# Patient Record
Sex: FEMALE | Race: WHITE | NOT HISPANIC OR LATINO | URBAN - METROPOLITAN AREA
[De-identification: names, ages, dates, MRNs, and addresses within clinical notes are randomized per-mention and may not be internally consistent; named-entity substitution may affect disease eponyms.]

---

## 2021-04-05 ENCOUNTER — OFFICE (OUTPATIENT)
Dept: URBAN - METROPOLITAN AREA CLINIC 107 | Facility: CLINIC | Age: 31
End: 2021-04-05

## 2021-04-05 VITALS
WEIGHT: 127 LBS | TEMPERATURE: 98.1 F | HEIGHT: 61 IN | SYSTOLIC BLOOD PRESSURE: 133 MMHG | DIASTOLIC BLOOD PRESSURE: 89 MMHG | HEART RATE: 80 BPM

## 2021-04-05 DIAGNOSIS — K52.839 MICROSCOPIC COLITIS, UNSPECIFIED: ICD-10-CM

## 2021-04-05 PROCEDURE — 99204 OFFICE O/P NEW MOD 45 MIN: CPT | Performed by: INTERNAL MEDICINE

## 2021-04-05 RX ORDER — BUDESONIDE 3 MG/1
CAPSULE ORAL
Qty: 132 | Refills: 0 | Status: COMPLETED
Start: 2021-04-05 | End: 2024-05-23

## 2021-04-05 NOTE — SERVICEHPINOTES
Jessie Gonsalez   is seen for an initial visit today.     She was diagnosed with IBS in [facility].  SHe has had issues for years. BRIn October of 2020, things got much worse and she was in and out of the bathroom up to 15 times a day. BRNo new triggers as far as she can remember, no NSAID used.  She had the colonoscopy in January, she was told it was lymphocytic colitis.  She had an EGD, US and colonoscope.  She thinks she was tested for celiac. Her pcp starte dicyclomine 20 mg 4 times, GI switched her to dicyclomine 20 mg three times a day.  SHe is taking mesalamine.  she is taking 400 mg 6 pills a day. She has  been on that since january.  She was also put on prednisone recently 30 mg a day.  They were planning on a taper.  Overall she is better but still having a lot of symptoms and not feeling like she is back to baseline.    Her mother was recently diagnosed with celiac and also has sjogrens. BR My nurse has reviewed and updated the medication list with the patient. I have reviewed the medication list along with the documented medical, social and family history. Pertinent details are also noted above in the HPI.

## 2021-04-05 NOTE — SERVICENOTES
Our goal is to partner with you to improve your health and well being. It is important for you to complete necessary testing and follow the instructions given to you at your clinic visit. Our office will call you within 2 weeks with results of any testing but you may also call sooner to obtain results - [phone].   If you have any questions or concerns please feel free to call us.  We take your care very seriously and we thank you for your trust!
- entocort/budesonide take 3 pills a day (9 mg) for 1 month, then 2 pills a day (6 mg) for 2 weeks then 1 pill a day (3 mg) for 2 weeks then stop.
- decrease prednisone to 20 mg (2 pills) for 2 days, then 10 mg for 2 days then stop 
- stop mesalamine 
- You can take pepto bismal 1-2 pills up to 4 times a day as needed to help control diarrhea
- you can take immodium 1 pill up to 4 times a day to help control diarrhea.  You can try taking one about 30 min before your meals and at bed
- I will get records and determine if we need to do additional testing
- follow up in 1 months

## 2021-05-06 ENCOUNTER — OFFICE (OUTPATIENT)
Dept: URBAN - METROPOLITAN AREA CLINIC 72 | Facility: CLINIC | Age: 31
End: 2021-05-06

## 2021-05-06 VITALS
SYSTOLIC BLOOD PRESSURE: 124 MMHG | HEART RATE: 76 BPM | WEIGHT: 128 LBS | TEMPERATURE: 97.3 F | DIASTOLIC BLOOD PRESSURE: 85 MMHG | HEIGHT: 61 IN

## 2021-05-06 DIAGNOSIS — D80.2 SELECTIVE DEFICIENCY OF IMMUNOGLOBULIN A [IGA]: ICD-10-CM

## 2021-05-06 DIAGNOSIS — K58.0 IRRITABLE BOWEL SYNDROME WITH DIARRHEA: ICD-10-CM

## 2021-05-06 DIAGNOSIS — K52.839 MICROSCOPIC COLITIS, UNSPECIFIED: ICD-10-CM

## 2021-05-06 PROCEDURE — 99214 OFFICE O/P EST MOD 30 MIN: CPT | Performed by: NURSE PRACTITIONER

## 2021-06-10 ENCOUNTER — TELEHEALTH PROVIDED OTHER THAN IN PATIENT'S HOME (OUTPATIENT)
Dept: URBAN - METROPOLITAN AREA CLINIC 72 | Facility: CLINIC | Age: 31
End: 2021-06-10

## 2021-06-10 VITALS — WEIGHT: 120 LBS | HEIGHT: 61 IN

## 2021-06-10 DIAGNOSIS — K52.839 MICROSCOPIC COLITIS, UNSPECIFIED: ICD-10-CM

## 2021-06-10 DIAGNOSIS — D80.2 SELECTIVE DEFICIENCY OF IMMUNOGLOBULIN A [IGA]: ICD-10-CM

## 2021-06-10 DIAGNOSIS — K58.0 IRRITABLE BOWEL SYNDROME WITH DIARRHEA: ICD-10-CM

## 2021-06-10 PROCEDURE — 99214 OFFICE O/P EST MOD 30 MIN: CPT | Performed by: NURSE PRACTITIONER

## 2024-05-23 ENCOUNTER — OFFICE (OUTPATIENT)
Dept: URBAN - METROPOLITAN AREA CLINIC 72 | Facility: CLINIC | Age: 34
End: 2024-05-23

## 2024-05-23 VITALS — WEIGHT: 115 LBS | HEIGHT: 61 IN

## 2024-05-23 DIAGNOSIS — R19.7 DIARRHEA, UNSPECIFIED: ICD-10-CM

## 2024-05-23 DIAGNOSIS — Z87.19 PERSONAL HISTORY OF OTHER DISEASES OF THE DIGESTIVE SYSTEM: ICD-10-CM

## 2024-05-23 DIAGNOSIS — E74.31 SUCRASE-ISOMALTASE DEFICIENCY: ICD-10-CM

## 2024-05-23 NOTE — SERVICEHPINOTES
She was initially seen by Dr Avila 4/5/2021brShe was diagnosed with IBS in [facility]. SHe has had issues for years.brIn October of 2020, things got much worse and she was in and out of the bathroom up to 15 times a day.brNo new triggers as far as she can remember, no NSAID used. She had the colonoscopy in January, she was told it was lymphocytic colitis. She had an EGD, US and colonoscope. She thinks she was tested for celiac. Her pcp starte dicyclomine 20 mg 4 times, GI switched her to dicyclomine 20 mg three times a day. SHe is taking mesalamine. she is taking 400 mg 6 pills a day. She has been on that since january. She was also put on prednisone recently 30 mg a day. They were planning on a taper. Overall she is better but still having a lot of symptoms and not feeling like she is back to baseline.Her mother was recently diagnosed with celiac and also has sjogrens.[facility] nurse has reviewed and updated the medication list with the patient. I have reviewed the medication list along with the documented medical, social and family history. Pertinent details are also noted above in the HPI.brAssessmentbr- Microscopic colitisbrPlan from first visitbr- entocort/budesonide take 3 pills a day (9 mg) for 1 month, then 2 pills a day (6 mg) for 2 weeks then 1 pill a day (3 mg) for 2 weeks then stop.br- decrease prednisone to 20 mg (2 pills) for 2 days, then 10 mg for 2 days then stopbr- stop mesalaminebr- You can take pepto bismal 1-2 pills up to 4 times a day as needed to help control diarrheabr- you can take immodium 1 pill up to 4 times a day to help control diarrhea. You can try taking one about 30 min before your meals and at bedbr- I will get records and determine if we need to do additional testingbr- follow up in 1 monthShe was seen  5/6/2021brAt her last visit she was having 5-6 bowel movements daily. She is now having 3-4 bowel movements daily. The bowel movements are formed and soft not watery. She has no further abdominal cramping. She has some bloating. Prior to January she would have 2-3 bowel movements first thing in the morning. She is completed budesonide 9 mg daily ×4 weeks. She did not start Pepto. She's taking dicyclomine 10 mg and 1 Imodium every morning.brPlan from last visitbr1. microcytic colitis- continue Budesonide taper to 6mg daily x 2 weeks, then 3 mg daily x 2 weeks. If symptoms, worsen during taper, call and go back to previous dose. Avoid dairy and NSAIDs Continue immodium up qid. Start VSL#3 daily.br2. IBS- continue Bnetyl 10 mg up to tid as needed. Consider further treatment options once colits resolves- xifaxan, colestipol, low dose TCA, Obtain EGD resultsbrKristen was last seen  6/10/2021brAfter 2 weeks on Budesonide 6mg daily, her symptoms became worse. She was having 7-10 Bm all day long. She went back to 9 mg daily x 1 week. She is better. She now has 3-4 loose BMs in the AM. She cut out dairy x 2 weeks with no change. She denies further weight loss. She has lost a total of 15 pounds. She denies N&ampV, bloating.
br Plan
br - continue budesonide 9 mg daily ×4 weeks, then decrease to 6 mg ×4 weeks and 3 mg ×4 weeks, then stop.  Consider colestipol or Xifaxan
br - Sucrose breath test
br
br she returns today, 5/23/2024
br Sucrose breath test was low.  Her insurance denied sucrase.  She tried Intoleran [place] without much reliefbr
.  she has been on an elimination diet since her diagnosis without much improvement.  She does been worse in the last 6 months.  She is having diarrhea all day every day.  She would have attacks with nausea and pain.  She is seeing a functional doctor at [facility]..  She is currently onSIBOzyme , probiotic andsodium butyrate  ×5 weeks and she is 60% better..br WorkupbrLabs
br7/20/2021 2:44 PM - GI Profile, Stool, PCR- negative
br6/22/2021 - Laboratory/Sucrose Breath Test - low br1/2021- stool DNA panel- negative, stool fecal fat- normalbr1/21/2021- CMP, CBC, iron, ferritin, TSH, TTG- normalbr1/2021- IgA - lowbr1/2021- liver workup- negative- ceruloplasmin, A1aT, Hep A ab, HepBsAg, HCV, ASm, AMA, HIV, LKM, NAOMI, C-ANCA, P-ANCA, EBVbr1/2021- positive Hep B sAb- immunity, positive Hep B cre Ab total- past [id number]/2020- H.Pylori Ag- [id number]/2020-TB 1.1, AST 65, ALT 50 brImagingbr1/2021- US- normalbrProceduresbr1/2021- Colonoscopy- DR Carrizales- positive lymphocytic colitisbr1/2021- EGD- mild gastritis, negative H.pylori, negative celiacbr visited="true"

## 2024-05-23 NOTE — SERVICENOTES
Patient takes [place]: taken before meals

Telehealth Platform Used:  Impermium
Location of patient:  Patient is at work
Location of provider:  [place] office
Other persons participating: Patient will be alone